# Patient Record
Sex: MALE | URBAN - METROPOLITAN AREA
[De-identification: names, ages, dates, MRNs, and addresses within clinical notes are randomized per-mention and may not be internally consistent; named-entity substitution may affect disease eponyms.]

---

## 2023-02-16 ENCOUNTER — OFFICE VISIT (OUTPATIENT)
Dept: URBAN - METROPOLITAN AREA CLINIC 25 | Facility: CLINIC | Age: 43
End: 2023-02-16
Payer: COMMERCIAL

## 2023-02-16 VITALS
WEIGHT: 201.4 LBS | HEART RATE: 61 BPM | HEIGHT: 74 IN | BODY MASS INDEX: 25.85 KG/M2 | TEMPERATURE: 97.7 F | DIASTOLIC BLOOD PRESSURE: 76 MMHG | SYSTOLIC BLOOD PRESSURE: 109 MMHG

## 2023-02-16 DIAGNOSIS — R14.0 BLOATING: ICD-10-CM

## 2023-02-16 DIAGNOSIS — R10.13 MIDEPIGASTRIC PAIN: ICD-10-CM

## 2023-02-16 PROCEDURE — 99204 OFFICE O/P NEW MOD 45 MIN: CPT

## 2023-02-16 RX ORDER — OMEPRAZOLE 40 MG/1
1 CAPSULE 30 MINUTES BEFORE MORNING MEAL CAPSULE, DELAYED RELEASE ORAL ONCE A DAY
Qty: 90 | Refills: 0 | OUTPATIENT
Start: 2023-02-16

## 2023-02-16 RX ORDER — DICYCLOMINE HYDROCHLORIDE 20 MG/1
1 TABLET TABLET ORAL
OUTPATIENT
Start: 2023-02-16 | End: 2023-03-18

## 2023-02-16 NOTE — HPI-SCREENING
Mr. Sepulveda is a 41y M, he presents today with abdominal pain around the mid epigastric and LUQ region, which has been present for 1 month now. Pain is described as a crampy, dull, achy pain. Pain is exacerbated post-prandially, denies NSAID use. States that pain is also exacerbated by standing for long periods of time and that it feels as though a spasm is occuring. States that pain is better after a bowel movement. Denies change in bowel habits. Denies any new changes in his diet or N/V. He also confirms some excess bloating. Pt's PCP advised them to try Prilosec, pt states he thinks he is getting relief from his pain. Denies heartburn, chest pain, sour taste in the mouth, cough, hoarseness, or NSAID use. Pt states he feels as though it is gas d/t its migratory nature in pain. He has tried gasx and cannot tell if it has helped with his pain. PCP did labs and pt states that they were all wnl, CT scan was done 2 weeks, and no acute findings on CT scan. States out of everything he has tried, he states probiotics give him the most relief.  Denies BRBPR, dysphagia, odynophagia, unintentional weight loss, constipation, diarrhea, famhx of colon CA, IBD.

## 2023-03-20 ENCOUNTER — OFFICE VISIT (OUTPATIENT)
Dept: URBAN - METROPOLITAN AREA SURGERY CENTER 20 | Facility: SURGERY CENTER | Age: 43
End: 2023-03-20

## 2023-04-14 ENCOUNTER — DASHBOARD ENCOUNTERS (OUTPATIENT)
Age: 43
End: 2023-04-14

## 2023-04-20 ENCOUNTER — OFFICE VISIT (OUTPATIENT)
Dept: URBAN - METROPOLITAN AREA CLINIC 25 | Facility: CLINIC | Age: 43
End: 2023-04-20

## 2023-04-20 RX ORDER — OMEPRAZOLE 40 MG/1
1 CAPSULE 30 MINUTES BEFORE MORNING MEAL CAPSULE, DELAYED RELEASE ORAL ONCE A DAY
Qty: 90 | Refills: 0 | COMMUNITY
Start: 2023-02-16